# Patient Record
Sex: FEMALE | Race: WHITE | Employment: FULL TIME | URBAN - METROPOLITAN AREA
[De-identification: names, ages, dates, MRNs, and addresses within clinical notes are randomized per-mention and may not be internally consistent; named-entity substitution may affect disease eponyms.]

---

## 2017-03-15 ENCOUNTER — APPOINTMENT (OUTPATIENT)
Dept: ULTRASOUND IMAGING | Facility: HOSPITAL | Age: 47
DRG: 419 | End: 2017-03-15
Attending: EMERGENCY MEDICINE
Payer: COMMERCIAL

## 2017-03-15 ENCOUNTER — HOSPITAL ENCOUNTER (INPATIENT)
Facility: HOSPITAL | Age: 47
LOS: 1 days | Discharge: HOME | DRG: 419 | End: 2017-03-16
Attending: EMERGENCY MEDICINE | Admitting: INTERNAL MEDICINE
Payer: COMMERCIAL

## 2017-03-15 DIAGNOSIS — K81.0 ACUTE CHOLECYSTITIS: Primary | ICD-10-CM

## 2017-03-15 PROBLEM — K80.50 BILIARY COLIC: Status: ACTIVE | Noted: 2017-03-15

## 2017-03-15 PROBLEM — R74.8 ELEVATED LIVER ENZYMES: Status: ACTIVE | Noted: 2017-03-15

## 2017-03-15 LAB
ALBUMIN SERPL-MCNC: 3.8 G/DL (ref 3.5–4.8)
ALP LIVER SERPL-CCNC: 87 U/L (ref 37–98)
ALT SERPL-CCNC: 268 U/L (ref 14–54)
AST SERPL-CCNC: 303 U/L (ref 15–41)
BASOPHILS # BLD AUTO: 0.04 X10(3) UL (ref 0–0.1)
BASOPHILS NFR BLD AUTO: 0.4 %
BILIRUB SERPL-MCNC: 1.4 MG/DL (ref 0.1–2)
BUN BLD-MCNC: 7 MG/DL (ref 8–20)
CALCIUM BLD-MCNC: 9.7 MG/DL (ref 8.3–10.3)
CHLORIDE: 107 MMOL/L (ref 101–111)
CO2: 26 MMOL/L (ref 22–32)
CREAT BLD-MCNC: 0.73 MG/DL (ref 0.55–1.02)
EOSINOPHIL # BLD AUTO: 0.02 X10(3) UL (ref 0–0.3)
EOSINOPHIL NFR BLD AUTO: 0.2 %
ERYTHROCYTE [DISTWIDTH] IN BLOOD BY AUTOMATED COUNT: 12.9 % (ref 11.5–16)
GLUCOSE BLD-MCNC: 101 MG/DL (ref 70–99)
HCT VFR BLD AUTO: 35.2 % (ref 34–50)
HGB BLD-MCNC: 12 G/DL (ref 12–16)
IMMATURE GRANULOCYTE COUNT: 0.04 X10(3) UL (ref 0–1)
IMMATURE GRANULOCYTE RATIO %: 0.4 %
LIPASE: 149 U/L (ref 73–393)
LYMPHOCYTES # BLD AUTO: 1.16 X10(3) UL (ref 0.9–4)
LYMPHOCYTES NFR BLD AUTO: 11 %
M PROTEIN MFR SERPL ELPH: 7.6 G/DL (ref 6.1–8.3)
MCH RBC QN AUTO: 31.6 PG (ref 27–33.2)
MCHC RBC AUTO-ENTMCNC: 34.1 G/DL (ref 31–37)
MCV RBC AUTO: 92.6 FL (ref 81–100)
MONOCYTES # BLD AUTO: 0.71 X10(3) UL (ref 0.1–0.6)
MONOCYTES NFR BLD AUTO: 6.7 %
NEUTROPHIL ABS PRELIM: 8.55 X10 (3) UL (ref 1.3–6.7)
NEUTROPHILS # BLD AUTO: 8.55 X10(3) UL (ref 1.3–6.7)
NEUTROPHILS NFR BLD AUTO: 81.3 %
PLATELET # BLD AUTO: 257 10(3)UL (ref 150–450)
POTASSIUM SERPL-SCNC: 3.8 MMOL/L (ref 3.6–5.1)
RBC # BLD AUTO: 3.8 X10(6)UL (ref 3.8–5.1)
RED CELL DISTRIBUTION WIDTH-SD: 43.7 FL (ref 35.1–46.3)
SODIUM SERPL-SCNC: 143 MMOL/L (ref 136–144)
WBC # BLD AUTO: 10.5 X10(3) UL (ref 4–13)

## 2017-03-15 PROCEDURE — 99223 1ST HOSP IP/OBS HIGH 75: CPT | Performed by: INTERNAL MEDICINE

## 2017-03-15 PROCEDURE — 76700 US EXAM ABDOM COMPLETE: CPT

## 2017-03-15 RX ORDER — HYDROMORPHONE HYDROCHLORIDE 1 MG/ML
0.5 INJECTION, SOLUTION INTRAMUSCULAR; INTRAVENOUS; SUBCUTANEOUS EVERY 30 MIN PRN
Status: DISCONTINUED | OUTPATIENT
Start: 2017-03-15 | End: 2017-03-15

## 2017-03-15 RX ORDER — NORGESTIMATE AND ETHINYL ESTRADIOL 0.25-0.035
1 KIT ORAL DAILY
COMMUNITY
End: 2018-11-09

## 2017-03-15 RX ORDER — ONDANSETRON 2 MG/ML
4 INJECTION INTRAMUSCULAR; INTRAVENOUS ONCE
Status: COMPLETED | OUTPATIENT
Start: 2017-03-15 | End: 2017-03-15

## 2017-03-15 RX ORDER — SODIUM CHLORIDE 9 MG/ML
INJECTION, SOLUTION INTRAVENOUS CONTINUOUS
Status: ACTIVE | OUTPATIENT
Start: 2017-03-15 | End: 2017-03-15

## 2017-03-15 RX ORDER — SODIUM CHLORIDE 9 MG/ML
INJECTION, SOLUTION INTRAVENOUS CONTINUOUS
Status: DISCONTINUED | OUTPATIENT
Start: 2017-03-15 | End: 2017-03-16

## 2017-03-15 RX ORDER — ACETAMINOPHEN 325 MG/1
650 TABLET ORAL EVERY 6 HOURS PRN
Status: DISCONTINUED | OUTPATIENT
Start: 2017-03-15 | End: 2017-03-16

## 2017-03-15 RX ORDER — HYDROMORPHONE HYDROCHLORIDE 1 MG/ML
0.5 INJECTION, SOLUTION INTRAMUSCULAR; INTRAVENOUS; SUBCUTANEOUS EVERY 2 HOUR PRN
Status: DISCONTINUED | OUTPATIENT
Start: 2017-03-15 | End: 2017-03-16

## 2017-03-15 RX ORDER — ONDANSETRON 2 MG/ML
4 INJECTION INTRAMUSCULAR; INTRAVENOUS EVERY 4 HOURS PRN
Status: DISCONTINUED | OUTPATIENT
Start: 2017-03-15 | End: 2017-03-15

## 2017-03-15 RX ORDER — ENOXAPARIN SODIUM 100 MG/ML
40 INJECTION SUBCUTANEOUS ONCE
Status: COMPLETED | OUTPATIENT
Start: 2017-03-15 | End: 2017-03-15

## 2017-03-15 RX ORDER — HYDROMORPHONE HYDROCHLORIDE 1 MG/ML
1 INJECTION, SOLUTION INTRAMUSCULAR; INTRAVENOUS; SUBCUTANEOUS EVERY 2 HOUR PRN
Status: DISCONTINUED | OUTPATIENT
Start: 2017-03-15 | End: 2017-03-16

## 2017-03-15 RX ORDER — ONDANSETRON 2 MG/ML
4 INJECTION INTRAMUSCULAR; INTRAVENOUS EVERY 6 HOURS PRN
Status: DISCONTINUED | OUTPATIENT
Start: 2017-03-15 | End: 2017-03-16

## 2017-03-15 NOTE — ED NOTES
Patient to [address] [facility] via stretcher by Joseline Echeverria, patient in stable condition.

## 2017-03-15 NOTE — ED NOTES
Report given to MAXIMILIANO MANNING Patient out of department, in [address].

## 2017-03-15 NOTE — ED PROVIDER NOTES
Patient Seen in: [facility] Emergency Department    History   Patient presents with:  Abdomen/Flank Pain (GI/)    Stated Complaint: RUQ pain, sent for r/o gallbladder    HPI    68-year-old female presents with 1 day of sharp right upper quadrant p elements reviewed from today and agreed except as otherwise stated in HPI.     Physical Exam       ED Triage Vitals   BP 03/15/17 1440 120/81 mmHg   Pulse 03/15/17 1440 91   Resp 03/15/17 1440 18   Temp 03/15/17 1440 98 °F (36.7 °C)   Temp src 03/15/17 1440 Please view results for these tests on the individual orders.    URINALYSIS WITH CULTURE REFLEX   POCT PREGNANCY, URINE   RAINBOW DRAW BLUE   RAINBOW DRAW GOLD   RAINBOW DRAW LAVENDER   RAINBOW DRAW LIGHT GREEN     Us Abdomen Complete (cpt=76700)    3/1 concerning for early cholecystitis. Patient remains afebrile and has no leukocytosis. Discussed with general surgery. I have concerned that this could continue to progress and I feel that clinically she is consistent with early cholecystitis.   Patient t

## 2017-03-16 ENCOUNTER — SURGERY (OUTPATIENT)
Age: 47
End: 2017-03-16

## 2017-03-16 ENCOUNTER — ANESTHESIA EVENT (OUTPATIENT)
Dept: SURGERY | Facility: HOSPITAL | Age: 47
End: 2017-03-16

## 2017-03-16 ENCOUNTER — ANESTHESIA (OUTPATIENT)
Dept: SURGERY | Facility: HOSPITAL | Age: 47
End: 2017-03-16

## 2017-03-16 VITALS
RESPIRATION RATE: 18 BRPM | BODY MASS INDEX: 21.98 KG/M2 | SYSTOLIC BLOOD PRESSURE: 137 MMHG | TEMPERATURE: 99 F | WEIGHT: 145 LBS | DIASTOLIC BLOOD PRESSURE: 76 MMHG | OXYGEN SATURATION: 100 % | HEART RATE: 71 BPM | HEIGHT: 68 IN

## 2017-03-16 LAB
ALBUMIN SERPL-MCNC: 2.8 G/DL (ref 3.5–4.8)
ALP LIVER SERPL-CCNC: 74 U/L (ref 37–98)
ALT SERPL-CCNC: 249 U/L (ref 14–54)
AST SERPL-CCNC: 190 U/L (ref 15–41)
BILIRUB SERPL-MCNC: 1.7 MG/DL (ref 0.1–2)
BUN BLD-MCNC: 6 MG/DL (ref 8–20)
CALCIUM BLD-MCNC: 8.3 MG/DL (ref 8.3–10.3)
CHLORIDE: 111 MMOL/L (ref 101–111)
CO2: 23 MMOL/L (ref 22–32)
CREAT BLD-MCNC: 0.86 MG/DL (ref 0.55–1.02)
GLUCOSE BLD-MCNC: 76 MG/DL (ref 70–99)
HCG URINE QUALITATIVE: NEGATIVE
INR BLD: 1.1 (ref 0.89–1.11)
M PROTEIN MFR SERPL ELPH: 5.9 G/DL (ref 6.1–8.3)
POTASSIUM SERPL-SCNC: 3.9 MMOL/L (ref 3.6–5.1)
PSA SERPL DL<=0.01 NG/ML-MCNC: 14.2 SECONDS (ref 12–14.3)
SODIUM SERPL-SCNC: 143 MMOL/L (ref 136–144)

## 2017-03-16 PROCEDURE — 99238 HOSP IP/OBS DSCHRG MGMT 30/<: CPT | Performed by: INTERNAL MEDICINE

## 2017-03-16 PROCEDURE — 0FT44ZZ RESECTION OF GALLBLADDER, PERCUTANEOUS ENDOSCOPIC APPROACH: ICD-10-PCS | Performed by: SURGERY

## 2017-03-16 RX ORDER — DEXTROSE, SODIUM CHLORIDE, SODIUM LACTATE, POTASSIUM CHLORIDE, AND CALCIUM CHLORIDE 5; .6; .31; .03; .02 G/100ML; G/100ML; G/100ML; G/100ML; G/100ML
INJECTION, SOLUTION INTRAVENOUS CONTINUOUS
Status: DISCONTINUED | OUTPATIENT
Start: 2017-03-16 | End: 2017-03-16

## 2017-03-16 RX ORDER — HYDROCODONE BITARTRATE AND ACETAMINOPHEN 5; 325 MG/1; MG/1
1 TABLET ORAL AS NEEDED
Status: DISCONTINUED | OUTPATIENT
Start: 2017-03-16 | End: 2017-03-16 | Stop reason: HOSPADM

## 2017-03-16 RX ORDER — HYDROMORPHONE HYDROCHLORIDE 1 MG/ML
1.2 INJECTION, SOLUTION INTRAMUSCULAR; INTRAVENOUS; SUBCUTANEOUS EVERY 2 HOUR PRN
Status: DISCONTINUED | OUTPATIENT
Start: 2017-03-16 | End: 2017-03-16

## 2017-03-16 RX ORDER — POLYETHYLENE GLYCOL 3350 17 G/17G
17 POWDER, FOR SOLUTION ORAL DAILY PRN
Status: DISCONTINUED | OUTPATIENT
Start: 2017-03-16 | End: 2017-03-16

## 2017-03-16 RX ORDER — MEPERIDINE HYDROCHLORIDE 25 MG/ML
12.5 INJECTION INTRAMUSCULAR; INTRAVENOUS; SUBCUTANEOUS AS NEEDED
Status: DISCONTINUED | OUTPATIENT
Start: 2017-03-16 | End: 2017-03-16 | Stop reason: HOSPADM

## 2017-03-16 RX ORDER — HYDROMORPHONE HYDROCHLORIDE 1 MG/ML
0.4 INJECTION, SOLUTION INTRAMUSCULAR; INTRAVENOUS; SUBCUTANEOUS EVERY 2 HOUR PRN
Status: DISCONTINUED | OUTPATIENT
Start: 2017-03-16 | End: 2017-03-16

## 2017-03-16 RX ORDER — BUPIVACAINE HYDROCHLORIDE 5 MG/ML
INJECTION, SOLUTION EPIDURAL; INTRACAUDAL AS NEEDED
Status: DISCONTINUED | OUTPATIENT
Start: 2017-03-16 | End: 2017-03-16 | Stop reason: HOSPADM

## 2017-03-16 RX ORDER — HYDROCODONE BITARTRATE AND ACETAMINOPHEN 5; 325 MG/1; MG/1
2 TABLET ORAL EVERY 4 HOURS PRN
Status: DISCONTINUED | OUTPATIENT
Start: 2017-03-16 | End: 2017-03-16

## 2017-03-16 RX ORDER — HYDROCODONE BITARTRATE AND ACETAMINOPHEN 5; 325 MG/1; MG/1
2 TABLET ORAL AS NEEDED
Status: DISCONTINUED | OUTPATIENT
Start: 2017-03-16 | End: 2017-03-16 | Stop reason: HOSPADM

## 2017-03-16 RX ORDER — SODIUM CHLORIDE, SODIUM LACTATE, POTASSIUM CHLORIDE, CALCIUM CHLORIDE 600; 310; 30; 20 MG/100ML; MG/100ML; MG/100ML; MG/100ML
INJECTION, SOLUTION INTRAVENOUS CONTINUOUS
Status: DISCONTINUED | OUTPATIENT
Start: 2017-03-16 | End: 2017-03-16

## 2017-03-16 RX ORDER — HYDROMORPHONE HYDROCHLORIDE 1 MG/ML
0.4 INJECTION, SOLUTION INTRAMUSCULAR; INTRAVENOUS; SUBCUTANEOUS EVERY 5 MIN PRN
Status: DISCONTINUED | OUTPATIENT
Start: 2017-03-16 | End: 2017-03-16 | Stop reason: HOSPADM

## 2017-03-16 RX ORDER — MIDAZOLAM HYDROCHLORIDE 1 MG/ML
1 INJECTION INTRAMUSCULAR; INTRAVENOUS EVERY 5 MIN PRN
Status: DISCONTINUED | OUTPATIENT
Start: 2017-03-16 | End: 2017-03-16 | Stop reason: HOSPADM

## 2017-03-16 RX ORDER — LIDOCAINE HYDROCHLORIDE AND EPINEPHRINE 10; 10 MG/ML; UG/ML
INJECTION, SOLUTION INFILTRATION; PERINEURAL AS NEEDED
Status: DISCONTINUED | OUTPATIENT
Start: 2017-03-16 | End: 2017-03-16 | Stop reason: HOSPADM

## 2017-03-16 RX ORDER — ONDANSETRON 2 MG/ML
4 INJECTION INTRAMUSCULAR; INTRAVENOUS AS NEEDED
Status: DISCONTINUED | OUTPATIENT
Start: 2017-03-16 | End: 2017-03-16 | Stop reason: HOSPADM

## 2017-03-16 RX ORDER — NALOXONE HYDROCHLORIDE 0.4 MG/ML
80 INJECTION, SOLUTION INTRAMUSCULAR; INTRAVENOUS; SUBCUTANEOUS AS NEEDED
Status: DISCONTINUED | OUTPATIENT
Start: 2017-03-16 | End: 2017-03-16 | Stop reason: HOSPADM

## 2017-03-16 RX ORDER — ZOLPIDEM TARTRATE 5 MG/1
5 TABLET ORAL NIGHTLY PRN
Status: DISCONTINUED | OUTPATIENT
Start: 2017-03-16 | End: 2017-03-16

## 2017-03-16 RX ORDER — HEPARIN SODIUM 5000 [USP'U]/ML
5000 INJECTION, SOLUTION INTRAVENOUS; SUBCUTANEOUS EVERY 12 HOURS
Status: DISCONTINUED | OUTPATIENT
Start: 2017-03-16 | End: 2017-03-16

## 2017-03-16 RX ORDER — DOCUSATE SODIUM 100 MG/1
100 CAPSULE, LIQUID FILLED ORAL 2 TIMES DAILY
Status: DISCONTINUED | OUTPATIENT
Start: 2017-03-16 | End: 2017-03-16

## 2017-03-16 RX ORDER — HYDROCODONE BITARTRATE AND ACETAMINOPHEN 5; 325 MG/1; MG/1
1 TABLET ORAL EVERY 4 HOURS PRN
Qty: 30 TABLET | Refills: 0 | Status: SHIPPED | OUTPATIENT
Start: 2017-03-16 | End: 2017-04-04

## 2017-03-16 RX ORDER — BISACODYL 10 MG
10 SUPPOSITORY, RECTAL RECTAL
Status: DISCONTINUED | OUTPATIENT
Start: 2017-03-16 | End: 2017-03-16

## 2017-03-16 RX ORDER — HYDROMORPHONE HYDROCHLORIDE 1 MG/ML
0.8 INJECTION, SOLUTION INTRAMUSCULAR; INTRAVENOUS; SUBCUTANEOUS EVERY 2 HOUR PRN
Status: DISCONTINUED | OUTPATIENT
Start: 2017-03-16 | End: 2017-03-16

## 2017-03-16 RX ORDER — ONDANSETRON 2 MG/ML
4 INJECTION INTRAMUSCULAR; INTRAVENOUS EVERY 6 HOURS PRN
Status: DISCONTINUED | OUTPATIENT
Start: 2017-03-16 | End: 2017-03-16

## 2017-03-16 RX ORDER — DIPHENHYDRAMINE HYDROCHLORIDE 50 MG/ML
12.5 INJECTION INTRAMUSCULAR; INTRAVENOUS AS NEEDED
Status: DISCONTINUED | OUTPATIENT
Start: 2017-03-16 | End: 2017-03-16 | Stop reason: HOSPADM

## 2017-03-16 RX ORDER — SODIUM PHOSPHATE, DIBASIC AND SODIUM PHOSPHATE, MONOBASIC 7; 19 G/133ML; G/133ML
1 ENEMA RECTAL ONCE AS NEEDED
Status: DISCONTINUED | OUTPATIENT
Start: 2017-03-16 | End: 2017-03-16

## 2017-03-16 RX ORDER — HYDROCODONE BITARTRATE AND ACETAMINOPHEN 5; 325 MG/1; MG/1
1 TABLET ORAL EVERY 4 HOURS PRN
Status: DISCONTINUED | OUTPATIENT
Start: 2017-03-16 | End: 2017-03-16

## 2017-03-16 NOTE — ANESTHESIA PREPROCEDURE EVALUATION
PRE-OP EVALUATION    Patient Name: Jessica Arteaga    Pre-op Diagnosis: Cholecystitis [K81.9]    Procedure(s):  LAPAROSCOPIC CHOLECYSTECTOMY POSSIBLE OPEN    Surgeon(s) and Role:     * Gian Her MD - Primary    Pre-op vitals reviewed.   Temp: 98.4 °F (36 history of anesthetic complications         GI/Hepatic/Renal    Negative GI/hepatic/renal ROS. Cardiovascular    Negative cardiovascular ROS.     Exercise tolerance: good     MET: >4                                           Endo

## 2017-03-16 NOTE — OPERATIVE REPORT
OPERATIVE REPORT   PREOPERATIVE DIAGNOSIS:  CHOLECYSTITIS     POSTOPERATIVE DIAGNOSIS: CHOLECYSTITIS    PROCEDURE PERFORMED: Laparoscopic cholecystectomy. ASSISTANT:  ASHLY Pinedo    INTRAOPERATIVE FINDINGS: Cholelithiasis, cholecystitis.    DESCRIPTION OF

## 2017-03-16 NOTE — CONSULTS
[facility]  Report of Consultation    [address] Patient Status:  Inpatient    1970 MRN [id number]   [facility] 3NW-A Attending Nellie Turner MD   Hosp Day # 1 PCP LUCY DURAN, [address] [place] [address]     Reason for Consultation:    Surgical Consul Medications:      No current facility-administered medications on file prior to encounter. Current Outpatient Prescriptions on File Prior to Encounter:  PRENATAL MULTI +DHA (PNV WITH DHA) 27-0.8-228 MG Oral Cap Take 1 capsule by mouth daily.  Disp:  Rfl: small calcified gallstones. The gallbladder wall is prominent diffusely, measuring up to 0.4 cm. There is trace pericholecystic fluid. Reported negative sonographic Singh's sign. No biliary dilatation. CBD is  measured at 0.4 cm.  PANCREAS:  Uniform echoge

## 2017-03-16 NOTE — PROGRESS NOTES
Patient discharged to home, discharge instructions reviewed with both the patient and her sister and they both verbalized their understanding of the instructions. Norco script filled for the patient by [employer]  The patient ambulated out of

## 2017-03-16 NOTE — ANESTHESIA POSTPROCEDURE EVALUATION
[address] Patient Status:  Inpatient   Age/Gender 42year old female MRN [id number]   [facility] SURGERY Attending Humera Bose MD   Hosp Day # 1 PCP LIZZY AYALA MD       Anesthesia Post-op Note    Procedure(s):  Dakota Beckman

## 2017-03-16 NOTE — DISCHARGE SUMMARY
[facility]  Discharge Summary    Jorge Lynn Patient Status:  Inpatient    1970 MRN [id number]   [facility] 3NW-A Attending No att. providers found   [facility] Day # 1 PCP LUCY DURAN, [address] [place] [address]     Date of Admission: 3/15/2017    Date of Anh Nunez 3/16/2017      Incidental or significant findings and recommendations (brief descriptions):  US ABDOMEN COMPLETE (CPT=76700)      COMPARISON:  None.      INDICATIONS:  RUQ pain, sent for r/o gallbladder      TECHNIQUE:  Real time gray-scale ultrasound was · None        Discharge Medications:        Discharge Medications      START taking these medications       Instructions Prescription details    HYDROcodone-acetaminophen 5-325 MG Tabs   Last time this was given:  1 tablet on 3/16/2017  2:18 PM   [facility] murmur,  regular rate and rhythm  Abdomen: soft, non-tender; bowel sounds normal;Laparoscopic abdominal wall incision in dressing  Extremities: extremities normal,no cyanosis or edema  Pulses: 2+ and symmetric  Skin: Laparoscopic abdominal wall incision in

## 2017-03-16 NOTE — PROGRESS NOTES
NURSING ADMISSION NOTE      Patient admitted via Cart  Oriented to room. Safety precautions initiated. Bed in low position. Call light in reach. Patient arrived from ED in stable condition. Patient resting comfortably in bed. Family at bedside.  Julia Reno

## 2017-03-16 NOTE — PAYOR COMM NOTE
Attending Physician: Tamy Lion MD    Review Type: ADMISSION   Reviewer: Brayden Webb       Date: March 16, 2017 - 9:06 AM  Payor: Ricci GARCÍA POS/JOE  Authorization Number: [id number]  Admit date: 3/15/2017  3:11 PM   Admitted from Emergency Dept.: yes    Yossi Arevalo dimensions. AORTA/IVC:  Smooth tapering.      =====  CONCLUSION:  There is cholelithiasis with a nondistended gallbladder. The gallbladder wall appears prominent and there is trace pericholecystic fluid.  These findings can reflect early cholecystitis, how 3/15/2017 1539 New Bag 1000 mL Intravenous Rosalinda Moreno RN          RESULTS LAST 24HRS:  Labs Reviewed   COMP METABOLIC PANEL (14) - Abnormal; Notable for the following:     Glucose 101 (*)     BUN 7 (*)      (*)     Alt 268 (*)     All other c complains of abdominal pain, noted she had similar pain in Feb though resolved on its own with motrin and rest.  Her current pain is worse, began Tuesday described as a pressure and bloating in the epigastrium.  + nausea, diaphoretic induced vomiting to tr illness. Physical Exam:    Blood pressure 108/64, pulse 78, temperature 98.4 °F (36.9 °C), temperature source Oral, resp. rate 14, height 68\", weight 145 lb (65.772 kg), last menstrual period 02/26/2017, SpO2 100 %, currently breastfeeding.     GENERAL: 11.3 cm. KIDNEYS:  Normal anatomic positions. No hydronephrosis or shadowing calculus. Right measures 11.6 and left 11.0 cm in bipolar dimensions.  AORTA/IVC:  Smooth tapering.      3/15/2017  CONCLUSION:  There is cholelithiasis with a nondistended gallbla

## 2017-03-16 NOTE — PLAN OF CARE
Assumed care for this patient at 0730: patient alert and oriented. Complains of pain to RUQ. Prn dilaudid. IV fluids infusing. IV zosyn. Plan for lap murali today. patient and family updated with plan of care and all questions answered.            DISCHARGE P

## 2017-03-16 NOTE — PROGRESS NOTES
Notified VIKASH Turner, that the patient is tolerating full liquids with no complaint of nausea, she rates her pain level at 2 out of 10 post Norco administration, and she is ambulating ad kathleen in the hallways.  PA stated that she can be discharged home

## 2017-03-16 NOTE — PLAN OF CARE
S/p lap murali. 4 laps sites intact. Vitals stable. IV dilaudid for pain. Fluids infusing. Plan for discharge later today when tolerating diet and pain controlled.

## 2017-03-17 NOTE — PAYOR COMM NOTE
Review Type: CONTINUED STAY  Reviewer: Almita Bullard     Date: March 17, 2017 - 9:19 AM  Payor: Kiki GARCÍA POS/MCNP  Authorization Number: [id number]  Admit date: 3/15/2017  3:11 PM     Date of Discharge: 3/16/2017      Disposition: Home or [facility] hospitalization:   · Laparoscopic cholecystectomy by surgery Dr. Carmne Herman on 3/16/2017

## 2019-01-02 ENCOUNTER — LABORATORY ENCOUNTER (OUTPATIENT)
Dept: LAB | Age: 49
End: 2019-01-02
Attending: INTERNAL MEDICINE
Payer: COMMERCIAL

## 2019-01-02 DIAGNOSIS — R14.0 BLOATING: ICD-10-CM

## 2019-01-02 DIAGNOSIS — K92.1 HEMATOCHEZIA: ICD-10-CM

## 2019-01-02 LAB
BASOPHILS # BLD AUTO: 0.06 X10(3) UL (ref 0–0.1)
BASOPHILS NFR BLD AUTO: 1.1 %
EOSINOPHIL # BLD AUTO: 0.21 X10(3) UL (ref 0–0.3)
EOSINOPHIL NFR BLD AUTO: 4 %
ERYTHROCYTE [DISTWIDTH] IN BLOOD BY AUTOMATED COUNT: 12.3 % (ref 11.5–16)
HCT VFR BLD AUTO: 36.8 % (ref 34–50)
HGB BLD-MCNC: 12.3 G/DL (ref 12–16)
IMMATURE GRANULOCYTE COUNT: 0.01 X10(3) UL (ref 0–1)
IMMATURE GRANULOCYTE RATIO %: 0.2 %
IMMUNOGLOBULIN A: 216 MG/DL (ref 70–312)
LYMPHOCYTES # BLD AUTO: 1.67 X10(3) UL (ref 0.9–4)
LYMPHOCYTES NFR BLD AUTO: 31.9 %
MCH RBC QN AUTO: 31.9 PG (ref 27–33.2)
MCHC RBC AUTO-ENTMCNC: 33.4 G/DL (ref 31–37)
MCV RBC AUTO: 95.3 FL (ref 81–100)
MONOCYTES # BLD AUTO: 0.61 X10(3) UL (ref 0.1–1)
MONOCYTES NFR BLD AUTO: 11.6 %
NEUTROPHIL ABS PRELIM: 2.68 X10 (3) UL (ref 1.3–6.7)
NEUTROPHILS # BLD AUTO: 2.68 X10(3) UL (ref 1.3–6.7)
NEUTROPHILS NFR BLD AUTO: 51.2 %
PLATELET # BLD AUTO: 272 10(3)UL (ref 150–450)
RBC # BLD AUTO: 3.86 X10(6)UL (ref 3.8–5.1)
RED CELL DISTRIBUTION WIDTH-SD: 42.8 FL (ref 35.1–46.3)
WBC # BLD AUTO: 5.2 X10(3) UL (ref 4–13)

## 2019-01-02 PROCEDURE — 85025 COMPLETE CBC W/AUTO DIFF WBC: CPT

## 2019-01-02 PROCEDURE — 83516 IMMUNOASSAY NONANTIBODY: CPT

## 2019-01-02 PROCEDURE — 82784 ASSAY IGA/IGD/IGG/IGM EACH: CPT

## 2019-01-02 PROCEDURE — 36415 COLL VENOUS BLD VENIPUNCTURE: CPT

## 2019-01-04 LAB
TISSUE TRANSGLUTAMINASE AB,IGA: 4.4 U/ML (ref ?–15)
TISSUE TRANSGLUTAMINASE IGA QUALITATIVE: NEGATIVE

## 2019-01-17 PROBLEM — K92.1 MELENA: Status: ACTIVE | Noted: 2019-01-17

## 2019-04-17 PROBLEM — K64.9 UNSPECIFIED HEMORRHOIDS: Status: ACTIVE | Noted: 2019-04-17

## 2021-05-02 ENCOUNTER — HOSPITAL ENCOUNTER (EMERGENCY)
Facility: HOSPITAL | Age: 51
Discharge: HOME | End: 2021-05-02
Attending: EMERGENCY MEDICINE
Payer: COMMERCIAL

## 2021-05-02 VITALS
RESPIRATION RATE: 16 BRPM | SYSTOLIC BLOOD PRESSURE: 116 MMHG | TEMPERATURE: 98 F | DIASTOLIC BLOOD PRESSURE: 72 MMHG | WEIGHT: 155 LBS | OXYGEN SATURATION: 99 % | HEIGHT: 67 IN | HEART RATE: 62 BPM | BODY MASS INDEX: 24.33 KG/M2

## 2021-05-02 DIAGNOSIS — R10.13 ABDOMINAL PAIN, EPIGASTRIC: Primary | ICD-10-CM

## 2021-05-02 PROCEDURE — 80053 COMPREHEN METABOLIC PANEL: CPT | Performed by: EMERGENCY MEDICINE

## 2021-05-02 PROCEDURE — 81025 URINE PREGNANCY TEST: CPT

## 2021-05-02 PROCEDURE — 83690 ASSAY OF LIPASE: CPT

## 2021-05-02 PROCEDURE — 99284 EMERGENCY DEPT VISIT MOD MDM: CPT

## 2021-05-02 PROCEDURE — 96374 THER/PROPH/DIAG INJ IV PUSH: CPT

## 2021-05-02 PROCEDURE — 85025 COMPLETE CBC W/AUTO DIFF WBC: CPT | Performed by: EMERGENCY MEDICINE

## 2021-05-02 PROCEDURE — 80053 COMPREHEN METABOLIC PANEL: CPT

## 2021-05-02 PROCEDURE — C9113 INJ PANTOPRAZOLE SODIUM, VIA: HCPCS | Performed by: EMERGENCY MEDICINE

## 2021-05-02 PROCEDURE — 83690 ASSAY OF LIPASE: CPT | Performed by: EMERGENCY MEDICINE

## 2021-05-02 PROCEDURE — 96375 TX/PRO/DX INJ NEW DRUG ADDON: CPT

## 2021-05-02 PROCEDURE — 96361 HYDRATE IV INFUSION ADD-ON: CPT

## 2021-05-02 PROCEDURE — 85025 COMPLETE CBC W/AUTO DIFF WBC: CPT

## 2021-05-02 PROCEDURE — 81003 URINALYSIS AUTO W/O SCOPE: CPT | Performed by: EMERGENCY MEDICINE

## 2021-05-02 RX ORDER — ONDANSETRON 2 MG/ML
4 INJECTION INTRAMUSCULAR; INTRAVENOUS ONCE
Status: COMPLETED | OUTPATIENT
Start: 2021-05-02 | End: 2021-05-02

## 2021-05-02 RX ORDER — DICYCLOMINE HCL 20 MG
20 TABLET ORAL 4 TIMES DAILY PRN
Qty: 30 TABLET | Refills: 0 | Status: SHIPPED | OUTPATIENT
Start: 2021-05-02 | End: 2021-06-01

## 2021-05-02 RX ORDER — MULTIVITAMIN
1 TABLET ORAL DAILY
COMMUNITY

## 2021-05-02 RX ORDER — SODIUM CHLORIDE 9 MG/ML
INJECTION, SOLUTION INTRAVENOUS CONTINUOUS
Status: DISCONTINUED | OUTPATIENT
Start: 2021-05-02 | End: 2021-05-02

## 2021-05-02 RX ORDER — HYDROMORPHONE HYDROCHLORIDE 1 MG/ML
0.5 INJECTION, SOLUTION INTRAMUSCULAR; INTRAVENOUS; SUBCUTANEOUS ONCE
Status: COMPLETED | OUTPATIENT
Start: 2021-05-02 | End: 2021-05-02

## 2021-05-02 NOTE — ED QUICK NOTES
DC instructions and RX reviewed with pt. No distress noted. Pt denies any further needs at this time. Pt waiting in room for spouse to bring her clothing. Pt thanks staff for care.

## 2021-05-02 NOTE — ED INITIAL ASSESSMENT (HPI)
Patient presents with c/o intermittent upper abdominal pain. She has had two intense waves of pain, one around 2am and then one again around 0630 this morning. She gets diaphoretic and pale when the pain occurs. Her pain is very mild at this time.  EMS did

## 2021-05-02 NOTE — ED PROVIDER NOTES
Patient Seen in: [facility] Emergency Department      History   Patient presents with:  Abdomen/Flank Pain    Stated Complaint: abdominal pain    HPI/Subjective:   HPI    21-year-old female presents to the emergency department complaining of 2 - 10 negative except as noted above.     Physical Exam     ED Triage Vitals [05/02/21 0713]   /67   Pulse 68   Resp 18   Temp 97.5 °F (36.4 °C)   Temp src Oral   SpO2 100 %   O2 Device None (Room air)       Current:/76   Pulse 67   Temp 97.5 °F (36.4 RAINBOW DRAW LIGHT GREEN   RAINBOW DRAW GOLD          Intravenous access was obtained. Laboratory studies were drawn. The patient continued to be virtually asymptomatic throughout the course of the emergency department stay.   Treatment options were

## 2024-04-10 NOTE — ED INITIAL ASSESSMENT (HPI)
Right upper quadrant/epigastric pain had been intermittent and is now constant since this morning. 17 normal... Finasteride Pregnancy And Lactation Text: This medication is absolutely contraindicated during pregnancy. It is unknown if it is excreted in breast milk.

## 2024-12-05 NOTE — H&P
[facility] [address] [place] [address] [phone]      Niki Lakhani :1953 MRN:[id number]    2024 Time Session Began: 902  Time Session Ended: 1003    Session Type:Therapy 53+ minutes (40058)    Others Present:     Intervention: Cognitive Behavioral    Suicide/Homicide/Violence Ideation: No    If Yes, explain: none reported    Current Outpatient Medications   Medication Sig    omeprazole (PrilOSEC) 20 MG capsule TAKE 1 CAPSULE BY MOUTH ONCE DAILY BEFORE BREAKFAST    hydrOXYzine (ATARAX) 10 MG tablet TAKE 1/2 TO 1 TABLET BY MOUTH NIGHTLY AS NEEDED FOR ANXIETY (OR SLEEP).    sertraline (ZOLOFT) 100 MG tablet Take 1 tablet by mouth daily.    atorvastatin (LIPITOR) 20 MG tablet Take 1 tablet by mouth daily.    docusate sodium (COLACE) 50 MG capsule Take 50 mg by mouth daily.    triamterene-hydrochlorothiazide (MAXZIDE) 75-50 MG per tablet Take 1 tablet by mouth daily as needed (swelling and water weight).    Cholecalciferol (Vitamin D3) 50 mcg (2,000 units) capsule Take 1 capsule by mouth daily. Start after completion of high dose therapy, long term supplement. Take with food.    fexofenadine (ALLEGRA) 180 MG tablet Take 1 tablet by mouth daily as needed (Allergies).     No current facility-administered medications for this visit.       Change in Medication(s) Reported: No  If Yes, explain:     Patient/Family Education Provided: Yes  Patient/Family Displays Understanding: Yes    If No, explain:     Chief complaint in patient's own words: \"I keep thinking this all has to be working.\"    Progress Note containing chief complaint and symptoms/problems related to the complaint:    (Data/Action/Response/Plan)    D: Patient reports I keep thinking that this all has to be working; its not just the medicine; I'm really catching on; I do still worry but not getting so down about it.  Only had one trigger point with Elizabeth tovar  [facility]  History and Physical     Marlyse Hunting Patient Status:  Inpatient    1970 MRN [id number]   [facility] 3NW-A Attending Garett Francisco MD   Hosp Day # 0 PCP LIZZY AYALA MD     Chief Complaint: abd pain    History of mucous membranes. EOM-I. PERRLA. Anicteric. Neck: No lymphadenopathy. Respiratory: Clear to auscultation bilaterally. No wheezes. No rhonchi. Cardiovascular: S1, S2. Regular rate and rhythm. No murmurs, rubs or gallops. Equal pulses.    Chest and Back: suicide.    Had difficulty with the challenging beliefs worksheet.    Updated Stuck point   (1) this could happen again  (2) he's stuck on drugs  (3) how could he do this again  (4)I can't make him what I want him to be - resolved  (5) I am not in control - resolved  (6) I am permanently damaged - resolved  (7) I think I'm ugly - resolved  (8) I am insignificant  (9) I have should done something  (10) what if something happens and we aren't there - resolved  (11) If I'm not there something will happen  (12) I can't be happy if Harvey isn't happy - resolved  (13) I don't want to feel selfish - resolved  (14) I'm only good enough if I'm helping Harvey     Action of the provider: Patient was provided with support. Patient's report of some symptom improvement, worry/anxiety and family issues was processed and reframed to build insight. Cognitions shared by patient were acknowledged and exploration was encouraged. Provider reviewed active coping, radical acceptance, thought challenging (..Automatic Negative Thoughts (ANTs) becoming Positive Automatic Neutral Thoughts (PANTs), cope ahead, reality testing, validation, assertive communication, and boundary setting.  Reviewed TRIGGER RESPONSE AVOIDANCE PATTERN/Trigger Response Active Coping worksheets and Pleasant Events worksheet.  Reviewed mindfulness and ..STOP skill (stop or step back; take a breath, take a moment; observe thoughts, feelings, what's happening around me, and/or what am I supposed to be doing; proceed mindfully with a plan, make a plan of what to do next, or how to handle the situation).  The STOP Skill goal is to not react or over-react; understanding emotions may want to make you react or over-react without thinking; so take the moment to breathe and use thought blocking and thought challenging strategies like PANTS (positive automatic or neutral thoughts) such as 'I can handle this' or 'breathe' or 'its not a big deal' to aide in resisting the urge to  act/react impulsively; really take notice of what is going on inside of yourself and around you; last you can act with awareness and mindfulness, identify your goal or what you would like to accomplish in this moment, use the WISE MIND skill to solve the problem and make a decision to help accomplish your goal.  Reviewed Challenging Beliefs worksheet.     Provider recommended patient work on TRIGGER RESPONSE AVOIDANCE PATTERN/Trigger Response Active Coping worksheet, as needed; continue to complete Challenging Belief Worksheets using stuck points; begin completing the Patterns in Problematic Thinking worksheet.         Intervention: Behavioral, Cognitive, Assessment     Response of patient: Sera was alert and oriented x4. Patient presented motivated. Patient presented as calm and cooperative. Mood appeared happy with congruent affect.   Eye contact was appropriate. Speech was normal in rate, normal in tone, and normal in volume. Motor activity was unremarkable. Thought process was future oriented and goal directed. Patient denies any suicidal ideation, homicidal ideation, or self-harm ideation.      Plan: Sera will return in 2 weeks or sooner if needed. Patient will complete Patterns in Problematic Thinking worksheet; and will complete challenging belief worksheet, and practice use of behavior activation and cope ahead skills discussed in session.     Need for Community Resources Assessed: Yes     Resources Needed: No     If Yes, what resources:      Primary Diagnosis: Diagnosis: Generalized Anxiety Disorder and Post Traumatic Stress Disorder  : 2 Moderate, 2 Episodic and Acute      Treatment Plan: Unchanged    Discharge Plan: Strategies Discussed to Maintain Gains    Next Appointment: 1/21/2025  Zaria Holguin LCSW   Stable

## (undated) DEVICE — UNDYED BRAIDED (POLYGLACTIN 910), SYNTHETIC ABSORBABLE SUTURE: Brand: COATED VICRYL

## (undated) DEVICE — LIGAMAX 5 MM ENDOSCOPIC MULTIPLE CLIP APPLIER: Brand: LIGAMAX

## (undated) DEVICE — ENDOPATH XCEL WITH OPTIVIEW TECHNOLOGY BLADELESS TROCARS WITH STABILITY SLEEVES: Brand: ENDOPATH XCEL OPTIVIEW

## (undated) DEVICE — ENDOPATH XCEL BLUNT TIP TROCARS WITH SMOOTH SLEEVES: Brand: ENDOPATH XCEL

## (undated) DEVICE — GLOVE ORTHO ALOETOUCH SZ 8-1/2

## (undated) DEVICE — 3M(TM) TEGADERM(TM) TRANSPARENT FILM DRESSING FRAME STYLE 9505W: Brand: 3M™ TEGADERM™

## (undated) DEVICE — LAP CHOLE/APPY CDS-LF: Brand: MEDLINE INDUSTRIES, INC.

## (undated) DEVICE — 3M™ TEGADERM™ TRANSPARENT FILM DRESSING, 1626W, 4 IN X 4-3/4 IN (10 CM X 12 CM), 50 EACH/CARTON, 4 CARTON/CASE: Brand: 3M™ TEGADERM™

## (undated) DEVICE — CHLORAPREP 26ML APPLICATOR

## (undated) DEVICE — KENDALL SCD EXPRESS SLEEVES, KNEE LENGTH, MEDIUM: Brand: KENDALL SCD

## (undated) DEVICE — VIOLET BRAIDED (POLYGLACTIN 910), SYNTHETIC ABSORBABLE SUTURE: Brand: COATED VICRYL

## (undated) DEVICE — GAUZE SPONGES,USP TYPE VII GAUZE, 12 PLY: Brand: CURITY

## (undated) DEVICE — ENDO POUCH

## (undated) NOTE — LETTER
[facility]  Katie Daughertyshlomo 63 [address] [facility], [address] [place] [address]    Consent for Operation    Date: __________________    Time: _______________    1.  I authorize the performance upon Carrion Bhargavi the following operation:    Procedure(s):  LAPAROSCOPIC CHOLECYSTE procedure has been videotaped, the surgeon will obtain the original videotape. The hospital will not be responsible for storage or maintenance of this tape.     6. For the purpose of advancing medical education, I consent to the admittance of observers to t STATEMENTS REQUIRING INSERTION OR COMPLETION WERE FILLED IN.     Signature of Patient:   ___________________________    When the patient is a minor or mentally incompetent to give consent:  Signature of person authorized to consent for patient: ____________ drugs/illegal medications). Failure to inform my anesthesiologist about these medicines may increase my risk of anesthetic complications. · If I am allergic to anything or have had a reaction to anesthesia before.     3. I understand how the anesthesia med I have discussed the procedure and information above with the patient (or patient’s representative) and answered their questions. The patient or their representative has agreed to have anesthesia services.     _______________________________________________

## (undated) NOTE — IP AVS SNAPSHOT
[facility] [place] [address] [place], [address] ~ [phone]                Discharge Summary   3/15/2017    Bard Cogan           Admission Information        Provider Department    3/15/2017 Rakesh Barth MD  3nw-A         Than Where to Get Your Medications      Please  your prescriptions at the location directed by your doctor or nurse     Bring a paper prescription for each of these medications    - HYDROcodone-acetaminophen 5-325 MG Tabs              Patient Instruction • Should you develop any problems prior to your scheduled appointment, do not hesitate  to call the office.     [address]  [phone]    Please call to set up your 1 week postop visit    Address:  [address] [place] [address] 5.9 (L) (03/16/17)  2.8 (L) (03/16/17)  143 (03/16/17)  3.9 (03/16/17)  111      Pending Labs     Order Current Status    SURGICAL PATHOLOGY TISSUE In process      Radiology Exams     None         Additional Information       We are concerned for your over Medication Side Effects - Medications to be taken at home  As your caregivers, we want you to be aware of the medications you are prescribed to take and their potential SIDE EFFECTS.  Your nurse will review your medications with you before you are discharge